# Patient Record
(demographics unavailable — no encounter records)

---

## 2024-12-24 NOTE — ASSESSMENT
[FreeTextEntry1] : 24 y old M with PMH of Lower L sided back and hip pain had to stop playing active sports ( [place]) noticed worse pain after 2019 when inured Hip with riding bicycle up the hill and no sig improvement with PT done twice followed by Orthopedic surgery Dr Collins and MRI of L hip shows progressive worse joint space narrowing of L hip joint without much osteophytes at this age concerning for inflammatory arthritis , reviewed blood work from previous Rheumatology evaluation 2023 CRP was  2 that was increased with blood work that he had , CCP and RF was normal liver and renal function was normal also no other extra articular symptoms no psoriasis no uveitis no oral ulcers, no recurrent infection, no dysuria no urinary tract infections no diarrhea no blood in urine or stool no cardiac or pulmonary symptoms ( GC was also negative that time), with workup + HLA B 27 suggestive ankylosing spondylitis high inflammatory markers Dx 5/24   HLA B 27 positive ankylosing spondylitis with bl hip and lower back pain   - no other extra articular symptoms like uveitis, no psoriasis no history of IBD --  +HLA B 27 positive ankylosing spondylitis on MRI mostly involvement of hip joint  with bl hip joint space narrowing and Left hip effusion , MRI pelvic 2023 no sig change of SI joints   -Tx with prednisone taper 30 mg   5/23/24 with significantly improved symptoms and then start Methotrexate from 5/24  and then Methotrexate  15 mg weekly with FA 1 mg daily ,  was taking 2.5 mg daily 6 days a week and stopped  due to stomach pain   -7/23/24 had flare was last time that took Prednisone for hip pain   -cw Methotrexate 15 mg weekly low -mod dose 6x2.5 mg tablets weekly with FA 1 mg daily   started  5/23/24 , side affects discussed, FA 1 mg daily , avoid liver toxins, not taking alcohol, discussed instructions how to take one day a week , stopped briefly due to stomach pain and restarted and tolerating since then ( was on prednisone that time also )   - started  Humira pen 40 mg every 2 weeks injection  from 8/2/24 with improved pain down to 1-2 out of 10 and morning stiffness less then 10 minutes , and remains in remission no joint pain or swelling and no morning stiffness    -side affects discussed with possible serious infections, low risk for malignancy , states vaccinations up to date with PMD send us records, infectious workup 5/24 hep B immune HCV was negative, states TB Was negative in the past-check QuantiFERON , HIV and Trep PAl ab  -suggested shingles vaccine as will be immunosuppressed with Biologic - will follow up with  PMD     -follow up 10-12  weeks

## 2024-12-24 NOTE — ASSESSMENT
[FreeTextEntry1] : 24 y old M with PMH of Lower L sided back and hip pain had to stop playing active sports ( [place]) noticed worse pain after 2019 when inured Hip with riding bicycle up the hill and no sig improvement with PT done twice followed by Orthopedic surgery Dr Collins and MRI of L hip shows progressive worse joint space narrowing of L hip joint without much osteophytes at this age concerning for inflammatory arthritis , reviewed blood work from previous Rheumatology evaluation 2023 CRP was  2 that was increased with blood work that he had , CCP and RF was normal liver and renal function was normal also no other extra articular symptoms no psoriasis no uveitis no oral ulcers, no recurrent infection, no dysuria no urinary tract infections no diarrhea no blood in urine or stool no cardiac or pulmonary symptoms ( GC was also negative that time), with workup + HLA B 27 suggestive ankylosing spondylitis high inflammatory markers Dx 5/24   HLA B 27 positive ankylosing spondylitis with bl hip and lower back pain   - no other extra articular symptoms like uveitis, no psoriasis no history of IBD --  +HLA B 27 positive ankylosing spondylitis on MRI mostly involvement of hip joint  with bl hip joint space narrowing and Left hip effusion , MRI pelvic 2023 no sig change of SI joints   -Tx with prednisone taper 30 mg   5/23/24 with significantly improved symptoms and then start Methotrexate from 5/24  and then Methotrexate  15 mg weekly with FA 1 mg daily ,  was taking 2.5 mg daily 6 days a week and stopped  due to stomach pain   -7/23/24 had flare was last time that took Prednisone for hip pain   -cw Methotrexate 15 mg weekly low -mod dose 6x2.5 mg tablets weekly with FA 1 mg daily   started  5/23/24 , side affects discussed, FA 1 mg daily , avoid liver toxins, not taking alcohol, discussed instructions how to take one day a week , stopped briefly due to stomach pain and restarted and tolerating since then ( was on prednisone that time also )   - started  Humira pen 40 mg every 2 weeks injection  from 8/2/24 with improved pain down to 1-2 out of 10 and morning stiffness less then 10 minutes , and remains in remission no joint pain or swelling and no morning stiffness    -side affects discussed with possible serious infections, low risk for malignancy , states vaccinations up to date with PMD send us records, infectious workup 5/24 hep B immune HCV was negative, states TB Was negative in the past-check QuantiFERON , HIV and Trep PAl ab  -suggested shingles vaccine as will be immunosuppressed with Biologic - will follow up with  PMD     -follow up 10-12  weeks

## 2024-12-24 NOTE — HISTORY OF PRESENT ILLNESS
[___ Week(s) Ago] : [unfilled] week(s) ago [FreeTextEntry1] : 12/17/24 feeling better no pain no joint swelling and no morning stiffness feels 100 % better  no skin rash no history of uveitis or scleritis no oral ulcers  tolerating medication well     9/17/24 follow up  Dx by me with HLA B 27 Ankylosing spondylitis 5/24   started Humira for  HLA B 27 + AS 8/24 with markedly improved joint pain and morning stiffness pain down to occasional 1-2 out of 10 today is 0 , no sig morning stiffness  no uveitis no oral ulcers, no recent infections ,tolerating medication well rest of the review system negative   8/7/24 since last visit started Humira from 8/2/24 , improved joint pain , pain down to 4 out of 10 and morning stiffness still up to 30 minutes was taking Methotrexate but just 2.5 mg tablet daily 6 days a week , had stomach pain last week and stopped prednisone and Methotrexate also  rest of the review system negative   7/23/24  today for follow up stopped prednisone 3 weeks ago and worse pain after that back back to 10 prior to that was down to 6-7 , BL hip and lower back today mostly L hip but it alternates and morning stiffness up to 1 hour  denies uveitis, no skin rash , no oral ulcers, no sob no chest pain no cough no recent infections, no dysphagia, no abd pain no blood in urine or stool no recent infections   6/5/24 started Methotrexate 6 tablets a week and FA 1 mg daily , Prednisone 15 mg since   day after last visit. 5/22/24 tolerating well , no GI complications, no other new symptoms L sided low back pain improved from 5 out of 10 pain to 0 , 100 percent improvement  no pain no morning stiffness  currently no other symptoms   5/22/24  Improved but persistent L hip pain , stiffness and lower back mostly L hip pain worse with rest and also activity pain decrease to 5 out of 10  with NSAID  no history of skin rash no uveitis no GI symptoms  no recurrent or recent infections  rest of the review system negative

## 2024-12-24 NOTE — PHYSICAL EXAM
[General Appearance - In No Acute Distress] : in no acute distress [PERRL With Normal Accommodation] : pupils were equal in size, round, and reactive to light [Examination Of The Oral Cavity] : the lips and gums were normal [Oropharynx] : the oropharynx was normal [Respiration, Rhythm And Depth] : normal respiratory rhythm and effort [Auscultation Breath Sounds / Voice Sounds] : lungs were clear to auscultation bilaterally [Chest Palpation] : palpation of the chest revealed no abnormalities [Heart Rate And Rhythm] : heart rate was normal and rhythm regular [Heart Sounds] : normal S1 and S2 [Murmurs] : no murmurs [Edema] : there was no peripheral edema [Bowel Sounds] : normal bowel sounds [Abdomen Soft] : soft [Abdomen Tenderness] : non-tender [No Spinal Tenderness] : no spinal tenderness [Musculoskeletal - Swelling] : no joint swelling seen [Motor Tone] : muscle strength and tone were normal [] : no rash [Motor Exam] : the motor exam was normal

## 2024-12-24 NOTE — DATA REVIEWED
[FreeTextEntry1] : Data reviewed from previous Rheumatology evaluation  4/5/23   HLA B27  +   CRP   2.7     UA no protein no RBC  normal AST and ALT and eGFR   CCP normal ,  RA normal   Quantiferon negative    MRI 4/4/24  left hip MRI   severe cartilage space naorrowing at the superior aspect of the  hip joint , subchondral cystlike change and marrow edema like signal at the acetabular roof , markedly increased in severity compared to MRI pelvis 2023 , mild hip joint synovitis  6/22/23  Pelvis MRI   partial tear of right and left abductor celine muscle, osteoarthritis of Right and left hip and small effusion of the left hip   5/15/23 Pelvic MRI no sig changes of SI joints    GC not detected - urine

## 2025-05-06 NOTE — ASSESSMENT
[FreeTextEntry1] : 25 y old M with PMH of Lower L sided back and hip pain had to stop playing active sports ( [place]) noticed worse pain after 2019 when inured Hip with riding bicycle up the hill and no sig improvement with PT done twice followed by Orthopedic surgery Dr Collins and MRI of L hip shows progressive worse joint space narrowing of L hip joint without much osteophytes at this age concerning for inflammatory arthritis , reviewed blood work from previous Rheumatology evaluation 2023 CRP was  2 that was increased with blood work that he had , CCP and RF was normal liver and renal function was normal also no other extra articular symptoms no psoriasis no uveitis no oral ulcers, no recurrent infection, no dysuria no urinary tract infections no diarrhea no blood in urine or stool no cardiac or pulmonary symptoms ( GC was also negative that time), with workup + HLA B 27 suggestive ankylosing spondylitis high inflammatory markers Dx 5/24   HLA B 27 positive ankylosing spondylitis with bl hip and lower back pain   - no other extra articular symptoms like uveitis, no psoriasis no history of IBD --  +HLA B 27 positive ankylosing spondylitis on MRI mostly involvement of hip joint  with bl hip joint space narrowing and Left hip effusion , MRI pelvic 2023 no sig change of SI joints   -Tx with prednisone taper 30 mg   5/23/24 with significantly improved symptoms and then start Methotrexate from 5/24  and then Methotrexate  15 mg weekly with FA 1 mg daily ,  was taking 2.5 mg daily 6 days a week and stopped  due to stomach pain   -7/23/24 had flare was last time that took Prednisone for hip pain   -due to increased hair loss decreased  Methotrexate 15 mg weekly to 10 mg weekly  , FA 1 mg daily   started  5/23/24 , side affects discussed, FA 1 mg daily , avoid liver toxins, not taking alcohol, discussed instructions how to take one day a week , stopped briefly due to stomach pain and restarted and tolerating since then ( was on prednisone that time also )   - started  Humira pen 40 mg every 2 weeks injection  from 8/2/24 with improved pain down to 1-2 out of 10 and morning stiffness less then 10 minutes , and remains in remission no joint pain or swelling and no morning stiffness    -side affects discussed with possible serious infections, low risk for malignancy , states vaccinations up to date with PMD send us records, infectious workup 5/24 hep B immune HCV was negative, states TB Was negative in the past-check QuantiFERON , HIV and Trep PAl ab  -suggested shingles vaccine as will be immunosuppressed with Biologic - will follow up with  PMD   -follow up 10-12  weeks

## 2025-05-06 NOTE — HISTORY OF PRESENT ILLNESS
[___ Week(s) Ago] : [unfilled] week(s) ago [FreeTextEntry1] : 5/1/2025 follow up  no current joint pain or swelling no morning stiffnes  remains well controlled  HLA B 27 + Ankylosing spondylitis  Dx 5/2024  tolerating well Humira every 2 weeks injection  also taking Methotrexate noticed increased hair loss taking FA 1 mg daily   12/17/24 feeling better no pain no joint swelling and no morning stiffness feels 100 % better  no skin rash no history of uveitis or scleritis no oral ulcers  tolerating medication well     9/17/24 follow up  Dx by me with HLA B 27 Ankylosing spondylitis 5/24   started Humira for  HLA B 27 + AS 8/24 with markedly improved joint pain and morning stiffness pain down to occasional 1-2 out of 10 today is 0 , no sig morning stiffness  no uveitis no oral ulcers, no recent infections ,tolerating medication well rest of the review system negative   8/7/24 since last visit started Humira from 8/2/24 , improved joint pain , pain down to 4 out of 10 and morning stiffness still up to 30 minutes was taking Methotrexate but just 2.5 mg tablet daily 6 days a week , had stomach pain last week and stopped prednisone and Methotrexate also  rest of the review system negative   7/23/24  today for follow up stopped prednisone 3 weeks ago and worse pain after that back back to 10 prior to that was down to 6-7 , BL hip and lower back today mostly L hip but it alternates and morning stiffness up to 1 hour  denies uveitis, no skin rash , no oral ulcers, no sob no chest pain no cough no recent infections, no dysphagia, no abd pain no blood in urine or stool no recent infections   6/5/24 started Methotrexate 6 tablets a week and FA 1 mg daily , Prednisone 15 mg since   day after last visit. 5/22/24 tolerating well , no GI complications, no other new symptoms L sided low back pain improved from 5 out of 10 pain to 0 , 100 percent improvement  no pain no morning stiffness  currently no other symptoms   5/22/24  Improved but persistent L hip pain , stiffness and lower back mostly L hip pain worse with rest and also activity pain decrease to 5 out of 10  with NSAID  no history of skin rash no uveitis no GI symptoms  no recurrent or recent infections  rest of the review system negative

## 2025-05-06 NOTE — ASSESSMENT
[FreeTextEntry1] : 25 y old M with PMH of Lower L sided back and hip pain had to stop playing active sports ( [place]) noticed worse pain after 2019 when inured Hip with riding bicycle up the hill and no sig improvement with PT done twice followed by Orthopedic surgery Dr Collins and MRI of L hip shows progressive worse joint space narrowing of L hip joint without much osteophytes at this age concerning for inflammatory arthritis , reviewed blood work from previous Rheumatology evaluation 2023 CRP was  2 that was increased with blood work that he had , CCP and RF was normal liver and renal function was normal also no other extra articular symptoms no psoriasis no uveitis no oral ulcers, no recurrent infection, no dysuria no urinary tract infections no diarrhea no blood in urine or stool no cardiac or pulmonary symptoms ( GC was also negative that time), with workup + HLA B 27 suggestive ankylosing spondylitis high inflammatory markers Dx 5/24   HLA B 27 positive ankylosing spondylitis with bl hip and lower back pain   - no other extra articular symptoms like uveitis, no psoriasis no history of IBD --  +HLA B 27 positive ankylosing spondylitis on MRI mostly involvement of hip joint  with bl hip joint space narrowing and Left hip effusion , MRI pelvic 2023 no sig change of SI joints   -Tx with prednisone taper 30 mg   5/23/24 with significantly improved symptoms and then start Methotrexate from 5/24  and then Methotrexate  15 mg weekly with FA 1 mg daily ,  was taking 2.5 mg daily 6 days a week and stopped  due to stomach pain   -7/23/24 had flare was last time that took Prednisone for hip pain   -due to increased hair loss decreased  Methotrexate 15 mg weekly to 10 mg weekly  , FA 1 mg daily   started  5/23/24 , side affects discussed, FA 1 mg daily , avoid liver toxins, not taking alcohol, discussed instructions how to take one day a week , stopped briefly due to stomach pain and restarted and tolerating since then ( was on prednisone that time also )   - started  Humira pen 40 mg every 2 weeks injection  from 8/2/24 with improved pain down to 1-2 out of 10 and morning stiffness less then 10 minutes , and remains in remission no joint pain or swelling and no morning stiffness    -side affects discussed with possible serious infections, low risk for malignancy , states vaccinations up to date with PMD send us records, infectious workup 5/24 hep B immune HCV was negative, states TB Was negative in the past-check QuantiFERON , HIV and Trep PAl ab  -suggested shingles vaccine as will be immunosuppressed with Biologic - will follow up with  PMD   -follow up 10-12  weeks